# Patient Record
Sex: MALE | Race: OTHER | URBAN - METROPOLITAN AREA
[De-identification: names, ages, dates, MRNs, and addresses within clinical notes are randomized per-mention and may not be internally consistent; named-entity substitution may affect disease eponyms.]

---

## 2023-05-15 ENCOUNTER — EMERGENCY (EMERGENCY)
Facility: HOSPITAL | Age: 23
LOS: 0 days | Discharge: HOME | End: 2023-05-15
Attending: EMERGENCY MEDICINE
Payer: COMMERCIAL

## 2023-05-15 VITALS
HEIGHT: 69 IN | OXYGEN SATURATION: 98 % | TEMPERATURE: 99 F | RESPIRATION RATE: 18 BRPM | SYSTOLIC BLOOD PRESSURE: 137 MMHG | DIASTOLIC BLOOD PRESSURE: 93 MMHG | WEIGHT: 222.01 LBS | HEART RATE: 102 BPM

## 2023-05-15 VITALS
HEART RATE: 75 BPM | TEMPERATURE: 97 F | SYSTOLIC BLOOD PRESSURE: 123 MMHG | OXYGEN SATURATION: 99 % | DIASTOLIC BLOOD PRESSURE: 71 MMHG | RESPIRATION RATE: 19 BRPM

## 2023-05-15 DIAGNOSIS — R07.89 OTHER CHEST PAIN: ICD-10-CM

## 2023-05-15 DIAGNOSIS — K76.0 FATTY (CHANGE OF) LIVER, NOT ELSEWHERE CLASSIFIED: ICD-10-CM

## 2023-05-15 DIAGNOSIS — J45.909 UNSPECIFIED ASTHMA, UNCOMPLICATED: ICD-10-CM

## 2023-05-15 LAB
ALBUMIN SERPL ELPH-MCNC: 4.4 G/DL — SIGNIFICANT CHANGE UP (ref 3.3–5)
ALP SERPL-CCNC: 121 U/L — HIGH (ref 40–120)
ALT FLD-CCNC: 80 U/L — HIGH (ref 12–78)
ANION GAP SERPL CALC-SCNC: 4 MMOL/L — LOW (ref 5–17)
AST SERPL-CCNC: 31 U/L — SIGNIFICANT CHANGE UP (ref 15–37)
BASOPHILS # BLD AUTO: 0.03 K/UL — SIGNIFICANT CHANGE UP (ref 0–0.2)
BASOPHILS NFR BLD AUTO: 0.3 % — SIGNIFICANT CHANGE UP (ref 0–2)
BILIRUB SERPL-MCNC: 8.1 MG/DL — HIGH (ref 0.2–1.2)
BUN SERPL-MCNC: 7 MG/DL — SIGNIFICANT CHANGE UP (ref 7–23)
CALCIUM SERPL-MCNC: 10 MG/DL — SIGNIFICANT CHANGE UP (ref 8.5–10.1)
CHLORIDE SERPL-SCNC: 105 MMOL/L — SIGNIFICANT CHANGE UP (ref 96–108)
CO2 SERPL-SCNC: 29 MMOL/L — SIGNIFICANT CHANGE UP (ref 22–31)
CREAT SERPL-MCNC: 1.17 MG/DL — SIGNIFICANT CHANGE UP (ref 0.5–1.3)
EGFR: 90 ML/MIN/1.73M2 — SIGNIFICANT CHANGE UP
EOSINOPHIL # BLD AUTO: 0.06 K/UL — SIGNIFICANT CHANGE UP (ref 0–0.5)
EOSINOPHIL NFR BLD AUTO: 0.7 % — SIGNIFICANT CHANGE UP (ref 0–6)
GLUCOSE SERPL-MCNC: 104 MG/DL — HIGH (ref 70–99)
HCT VFR BLD CALC: 47.7 % — SIGNIFICANT CHANGE UP (ref 39–50)
HGB BLD-MCNC: 14.6 G/DL — SIGNIFICANT CHANGE UP (ref 13–17)
IMM GRANULOCYTES NFR BLD AUTO: 0.2 % — SIGNIFICANT CHANGE UP (ref 0–0.9)
LIDOCAIN IGE QN: 284 U/L — SIGNIFICANT CHANGE UP (ref 73–393)
LYMPHOCYTES # BLD AUTO: 2.2 K/UL — SIGNIFICANT CHANGE UP (ref 1–3.3)
LYMPHOCYTES # BLD AUTO: 25.6 % — SIGNIFICANT CHANGE UP (ref 13–44)
MAGNESIUM SERPL-MCNC: 2.3 MG/DL — SIGNIFICANT CHANGE UP (ref 1.6–2.6)
MCHC RBC-ENTMCNC: 25.6 PG — LOW (ref 27–34)
MCHC RBC-ENTMCNC: 30.6 G/DL — LOW (ref 32–36)
MCV RBC AUTO: 83.5 FL — SIGNIFICANT CHANGE UP (ref 80–100)
MONOCYTES # BLD AUTO: 0.55 K/UL — SIGNIFICANT CHANGE UP (ref 0–0.9)
MONOCYTES NFR BLD AUTO: 6.4 % — SIGNIFICANT CHANGE UP (ref 2–14)
NEUTROPHILS # BLD AUTO: 5.74 K/UL — SIGNIFICANT CHANGE UP (ref 1.8–7.4)
NEUTROPHILS NFR BLD AUTO: 66.8 % — SIGNIFICANT CHANGE UP (ref 43–77)
NRBC # BLD: 0 /100 WBCS — SIGNIFICANT CHANGE UP (ref 0–0)
PLATELET # BLD AUTO: 188 K/UL — SIGNIFICANT CHANGE UP (ref 150–400)
POTASSIUM SERPL-MCNC: 4.2 MMOL/L — SIGNIFICANT CHANGE UP (ref 3.5–5.3)
POTASSIUM SERPL-SCNC: 4.2 MMOL/L — SIGNIFICANT CHANGE UP (ref 3.5–5.3)
PROT SERPL-MCNC: 8.1 GM/DL — SIGNIFICANT CHANGE UP (ref 6–8.3)
RBC # BLD: 5.71 M/UL — SIGNIFICANT CHANGE UP (ref 4.2–5.8)
RBC # FLD: 13.2 % — SIGNIFICANT CHANGE UP (ref 10.3–14.5)
SODIUM SERPL-SCNC: 138 MMOL/L — SIGNIFICANT CHANGE UP (ref 135–145)
TROPONIN I, HIGH SENSITIVITY RESULT: 6.9 NG/L — SIGNIFICANT CHANGE UP
WBC # BLD: 8.6 K/UL — SIGNIFICANT CHANGE UP (ref 3.8–10.5)
WBC # FLD AUTO: 8.6 K/UL — SIGNIFICANT CHANGE UP (ref 3.8–10.5)

## 2023-05-15 PROCEDURE — 74177 CT ABD & PELVIS W/CONTRAST: CPT | Mod: 26,MA

## 2023-05-15 PROCEDURE — 76700 US EXAM ABDOM COMPLETE: CPT | Mod: 26

## 2023-05-15 PROCEDURE — 71046 X-RAY EXAM CHEST 2 VIEWS: CPT | Mod: 26

## 2023-05-15 PROCEDURE — 93010 ELECTROCARDIOGRAM REPORT: CPT

## 2023-05-15 PROCEDURE — 99285 EMERGENCY DEPT VISIT HI MDM: CPT

## 2023-05-15 RX ORDER — ACETAMINOPHEN 500 MG
975 TABLET ORAL ONCE
Refills: 0 | Status: COMPLETED | OUTPATIENT
Start: 2023-05-15 | End: 2023-05-15

## 2023-05-15 RX ADMIN — Medication 975 MILLIGRAM(S): at 05:20

## 2023-05-15 NOTE — ED PROVIDER NOTE - PATIENT PORTAL LINK FT
You can access the FollowMyHealth Patient Portal offered by [facility] by registering at the following website: [email or web address]. By joining [facility]’s FollowMyHealth portal, you will also be able to view your health information using other applications (apps) compatible with our system.

## 2023-05-15 NOTE — ED PROVIDER NOTE - CLINICAL SUMMARY MEDICAL DECISION MAKING FREE TEXT BOX
cp. doubt acs. perc neg. xray neg for pna, pnthx. gerd? will give tylenol and reassess. As interpreted by ED physician, ECG is NSR with normal intervals/axis, no changes in QRS, no ST/T changes.

## 2023-05-15 NOTE — ED ADULT TRIAGE NOTE - CHIEF COMPLAINT QUOTE
PMH of panic attack  C/o chest pain, dizziness, nausea, right arm numbness. " I feel like having panic attack" Speaks full complete sentences.

## 2023-05-15 NOTE — ED ADULT NURSE NOTE - NSFALLUNIVINTERV_ED_ALL_ED
Bed/Stretcher in lowest position, wheels locked, appropriate side rails in place/Call bell, personal items and telephone in reach/Instruct patient to call for assistance before getting out of bed/chair/stretcher/Non-slip footwear applied when patient is off stretcher/[place] to call system/Physically safe environment - no spills, clutter or unnecessary equipment/Purposeful proactive rounding/Room/bathroom lighting operational, light cord in reach

## 2023-05-15 NOTE — ED PROVIDER NOTE - CARE PLAN
Principal Discharge DX:	Acute chest pain   1 Principal Discharge DX:	Acute chest pain  Secondary Diagnosis:	Hepatic steatosis

## 2023-05-15 NOTE — ED PROVIDER NOTE - OBJECTIVE STATEMENT
22M hx asthma pw midsternal to right sided cp since 2am. started suddenly while sleeping, triggering a panic attack. he notes the panic attack has improved. no sob. no nausea or vomiting. no fever. didn't take anything for pain.

## 2023-05-15 NOTE — ED ADULT NURSE NOTE - OBJECTIVE STATEMENT
Hx asthma, ,panic attack. Pt c/o R sided chest pain and R arm numbness that began at  2 am. Denies radiation, nausea, SOB and dizziness.

## 2025-05-23 ENCOUNTER — EMERGENCY (EMERGENCY)
Facility: HOSPITAL | Age: 25
LOS: 0 days | Discharge: HOME | End: 2025-05-23
Attending: STUDENT IN AN ORGANIZED HEALTH CARE EDUCATION/TRAINING PROGRAM
Payer: COMMERCIAL

## 2025-05-23 VITALS
TEMPERATURE: 99 F | RESPIRATION RATE: 23 BRPM | SYSTOLIC BLOOD PRESSURE: 140 MMHG | HEART RATE: 74 BPM | DIASTOLIC BLOOD PRESSURE: 77 MMHG | OXYGEN SATURATION: 100 %

## 2025-05-23 VITALS
WEIGHT: 240.08 LBS | TEMPERATURE: 98 F | RESPIRATION RATE: 18 BRPM | HEART RATE: 121 BPM | OXYGEN SATURATION: 97 % | HEIGHT: 69 IN | SYSTOLIC BLOOD PRESSURE: 136 MMHG | DIASTOLIC BLOOD PRESSURE: 89 MMHG

## 2025-05-23 DIAGNOSIS — R06.02 SHORTNESS OF BREATH: ICD-10-CM

## 2025-05-23 DIAGNOSIS — R00.2 PALPITATIONS: ICD-10-CM

## 2025-05-23 DIAGNOSIS — G89.18 OTHER ACUTE POSTPROCEDURAL PAIN: ICD-10-CM

## 2025-05-23 DIAGNOSIS — R07.9 CHEST PAIN, UNSPECIFIED: ICD-10-CM

## 2025-05-23 DIAGNOSIS — R10.9 UNSPECIFIED ABDOMINAL PAIN: ICD-10-CM

## 2025-05-23 LAB
ALBUMIN SERPL ELPH-MCNC: 4.8 G/DL — SIGNIFICANT CHANGE UP (ref 3.3–5)
ALP SERPL-CCNC: 128 U/L — HIGH (ref 40–120)
ALT FLD-CCNC: 118 U/L — HIGH (ref 12–78)
ANION GAP SERPL CALC-SCNC: 12 MMOL/L — SIGNIFICANT CHANGE UP (ref 5–17)
APPEARANCE UR: CLEAR — SIGNIFICANT CHANGE UP
AST SERPL-CCNC: 32 U/L — SIGNIFICANT CHANGE UP (ref 15–37)
BACTERIA # UR AUTO: ABNORMAL /HPF
BASOPHILS # BLD AUTO: 0.02 K/UL — SIGNIFICANT CHANGE UP (ref 0–0.2)
BASOPHILS NFR BLD AUTO: 0.2 % — SIGNIFICANT CHANGE UP (ref 0–2)
BILIRUB SERPL-MCNC: 0.8 MG/DL — SIGNIFICANT CHANGE UP (ref 0.2–1.2)
BILIRUB UR-MCNC: NEGATIVE — SIGNIFICANT CHANGE UP
BUN SERPL-MCNC: 11 MG/DL — SIGNIFICANT CHANGE UP (ref 7–23)
CALCIUM SERPL-MCNC: 10 MG/DL — SIGNIFICANT CHANGE UP (ref 8.5–10.1)
CHLORIDE SERPL-SCNC: 101 MMOL/L — SIGNIFICANT CHANGE UP (ref 96–108)
CO2 SERPL-SCNC: 22 MMOL/L — SIGNIFICANT CHANGE UP (ref 22–31)
COLOR SPEC: YELLOW — SIGNIFICANT CHANGE UP
COMMENT - URINE: SIGNIFICANT CHANGE UP
CREAT SERPL-MCNC: 1.16 MG/DL — SIGNIFICANT CHANGE UP (ref 0.5–1.3)
D DIMER BLD IA.RAPID-MCNC: 443 NG/ML DDU — HIGH
DIFF PNL FLD: NEGATIVE — SIGNIFICANT CHANGE UP
EGFR: 90 ML/MIN/1.73M2 — SIGNIFICANT CHANGE UP
EGFR: 90 ML/MIN/1.73M2 — SIGNIFICANT CHANGE UP
EOSINOPHIL # BLD AUTO: 0.01 K/UL — SIGNIFICANT CHANGE UP (ref 0–0.5)
EOSINOPHIL NFR BLD AUTO: 0.1 % — SIGNIFICANT CHANGE UP (ref 0–6)
EPI CELLS # UR: PRESENT
GLUCOSE SERPL-MCNC: 106 MG/DL — HIGH (ref 70–99)
GLUCOSE UR QL: NEGATIVE MG/DL — SIGNIFICANT CHANGE UP
HCT VFR BLD CALC: 48.7 % — SIGNIFICANT CHANGE UP (ref 39–50)
HGB BLD-MCNC: 15.6 G/DL — SIGNIFICANT CHANGE UP (ref 13–17)
HYALINE CASTS # UR AUTO: PRESENT
IMM GRANULOCYTES NFR BLD AUTO: 0.3 % — SIGNIFICANT CHANGE UP (ref 0–0.9)
KETONES UR QL: >=160 MG/DL
LACTATE SERPL-SCNC: 1 MMOL/L — SIGNIFICANT CHANGE UP (ref 0.7–2)
LEUKOCYTE ESTERASE UR-ACNC: NEGATIVE — SIGNIFICANT CHANGE UP
LIDOCAIN IGE QN: 58 U/L — SIGNIFICANT CHANGE UP (ref 13–75)
LYMPHOCYTES # BLD AUTO: 1.35 K/UL — SIGNIFICANT CHANGE UP (ref 1–3.3)
LYMPHOCYTES # BLD AUTO: 13.4 % — SIGNIFICANT CHANGE UP (ref 13–44)
MCHC RBC-ENTMCNC: 25.5 PG — LOW (ref 27–34)
MCHC RBC-ENTMCNC: 32 G/DL — SIGNIFICANT CHANGE UP (ref 32–36)
MCV RBC AUTO: 79.6 FL — LOW (ref 80–100)
MONOCYTES # BLD AUTO: 0.8 K/UL — SIGNIFICANT CHANGE UP (ref 0–0.9)
MONOCYTES NFR BLD AUTO: 8 % — SIGNIFICANT CHANGE UP (ref 2–14)
NEUTROPHILS # BLD AUTO: 7.84 K/UL — HIGH (ref 1.8–7.4)
NEUTROPHILS NFR BLD AUTO: 78 % — HIGH (ref 43–77)
NITRITE UR-MCNC: NEGATIVE — SIGNIFICANT CHANGE UP
NRBC BLD AUTO-RTO: 0 /100 WBCS — SIGNIFICANT CHANGE UP (ref 0–0)
PH UR: 6 — SIGNIFICANT CHANGE UP (ref 5–8)
PLATELET # BLD AUTO: 215 K/UL — SIGNIFICANT CHANGE UP (ref 150–400)
POTASSIUM SERPL-MCNC: 4.4 MMOL/L — SIGNIFICANT CHANGE UP (ref 3.5–5.3)
POTASSIUM SERPL-SCNC: 4.4 MMOL/L — SIGNIFICANT CHANGE UP (ref 3.5–5.3)
PROT SERPL-MCNC: 9.1 GM/DL — HIGH (ref 6–8.3)
PROT UR-MCNC: 30 MG/DL
RBC # BLD: 6.12 M/UL — HIGH (ref 4.2–5.8)
RBC # FLD: 13.6 % — SIGNIFICANT CHANGE UP (ref 10.3–14.5)
RBC CASTS # UR COMP ASSIST: 0 /HPF — SIGNIFICANT CHANGE UP (ref 0–4)
SODIUM SERPL-SCNC: 135 MMOL/L — SIGNIFICANT CHANGE UP (ref 135–145)
SP GR SPEC: >1.03 — HIGH (ref 1–1.03)
UROBILINOGEN FLD QL: 1 MG/DL — SIGNIFICANT CHANGE UP (ref 0.2–1)
WBC # BLD: 10.05 K/UL — SIGNIFICANT CHANGE UP (ref 3.8–10.5)
WBC # FLD AUTO: 10.05 K/UL — SIGNIFICANT CHANGE UP (ref 3.8–10.5)
WBC UR QL: 2 /HPF — SIGNIFICANT CHANGE UP (ref 0–5)

## 2025-05-23 PROCEDURE — 71045 X-RAY EXAM CHEST 1 VIEW: CPT | Mod: 26

## 2025-05-23 PROCEDURE — 93010 ELECTROCARDIOGRAM REPORT: CPT

## 2025-05-23 PROCEDURE — 71275 CT ANGIOGRAPHY CHEST: CPT | Mod: 26

## 2025-05-23 PROCEDURE — 99285 EMERGENCY DEPT VISIT HI MDM: CPT

## 2025-05-23 PROCEDURE — 74177 CT ABD & PELVIS W/CONTRAST: CPT | Mod: 26

## 2025-05-23 RX ORDER — ACETAMINOPHEN 500 MG/5ML
1000 LIQUID (ML) ORAL ONCE
Refills: 0 | Status: COMPLETED | OUTPATIENT
Start: 2025-05-23 | End: 2025-05-23

## 2025-05-23 RX ORDER — SENNOSIDES, DOCUSATE SODIUM 8.6; 5 MG/1; MG/1
2 TABLET ORAL
Qty: 40 | Refills: 0
Start: 2025-05-23 | End: 2025-06-01

## 2025-05-23 RX ORDER — OXYCODONE HYDROCHLORIDE AND ACETAMINOPHEN 10; 325 MG/1; MG/1
1 TABLET ORAL
Qty: 15 | Refills: 0
Start: 2025-05-23 | End: 2025-05-27

## 2025-05-23 RX ORDER — ACETAMINOPHEN 500 MG/5ML
2 LIQUID (ML) ORAL
Qty: 30 | Refills: 0
Start: 2025-05-23 | End: 2025-05-27

## 2025-05-23 RX ORDER — IOHEXOL 350 MG/ML
30 INJECTION, SOLUTION INTRAVENOUS ONCE
Refills: 0 | Status: COMPLETED | OUTPATIENT
Start: 2025-05-23 | End: 2025-05-23

## 2025-05-23 RX ADMIN — Medication 1000 MILLIGRAM(S): at 04:00

## 2025-05-23 RX ADMIN — Medication 4 MILLIGRAM(S): at 03:29

## 2025-05-23 RX ADMIN — IOHEXOL 30 MILLILITER(S): 350 INJECTION, SOLUTION INTRAVENOUS at 03:38

## 2025-05-23 RX ADMIN — Medication 1000 MILLILITER(S): at 05:12

## 2025-05-23 RX ADMIN — Medication 400 MILLIGRAM(S): at 04:08

## 2025-05-23 RX ADMIN — Medication 4 MILLIGRAM(S): at 05:50

## 2025-05-23 RX ADMIN — Medication 4 MILLIGRAM(S): at 05:17

## 2025-05-23 RX ADMIN — Medication 1000 MILLILITER(S): at 03:30

## 2025-05-23 RX ADMIN — Medication 4 MILLIGRAM(S): at 04:00

## 2025-05-23 NOTE — ED PROVIDER NOTE - NS_EDPROVIDERDISPOUSERTYPE_ED_A_ED
History of Present Illness		  This is a 62 year old male with a PMHx of HTN, HLD, DM with insulin resistance, CRI, severe ICM, sp CABG 3V in 2014, AICD (BostS) in 2014, PVD sp right iliac artery and left SFA stent in the past, moderate L carotid stenosis, sleep apnea, and VT. He has be c/o dyspnea and bilateral claudication.  He was sent for a PET/CT sestamibi 2/26/2020 which showed a moderate area of moderate ischemia involving the anterior and anterolateral wall.  Patient for cardiac catheterization 3/10/2020.    ASA 2  Mallampati II  GFR 40  Creat  1.76  Bleeding Risk 1.6%     NPO>6 hours  Dr Blanco to consent  Daily compliance with aspirin/plavix and took today  IVF for CRI in progress-followed by Dr renioso        REVIEW OF SYSTEMS:  Denies SOB, CP, NV, HA, dizziness, palpitations    PHYSICAL EXAM: A&Ox3 NAD Skin warm and dry  NEURO: Speech intact +gag +swallow Tongue midline JACOME  NECK: No JVD, trachea midline. Eupneic  HEART: RRR S1S2 no g/m SR on tele no ectopy BSC ICD  PULMONARY:  CTA claudine  ABDOMEN: Soft nontender X4 +BS   EXTREMITIES:  JACOME/FROM all pp+/palpable History of Present Illness		  This is a 62 year old male with a PMHx of HTN, HLD, DM with insulin resistance, CRI, severe ICM, sp CABG 3V in 2014, AICD (BostS) in 2014, PVD sp right iliac artery and left SFA stent in the past, moderate L carotid stenosis, sleep apnea, and VT. He has be c/o dyspnea and bilateral claudication.  He was sent for a PET/CT sestamibi 2/26/2020 which showed a moderate area of moderate ischemia involving the anterior and anterolateral wall.  Patient for cardiac catheterization 3/10/2020.    ASA 2  Mallampati II  GFR 40  Creat  1.76  Bleeding Risk 1.6%     NPO>6 hours  Dr Blanco to consent  Daily compliance with aspirin/plavix and took today  IVF for CRI in progress-followed by Dr reinoso        REVIEW OF SYSTEMS:  Denies SOB, CP, NV, HA, dizziness, palpitations    PHYSICAL EXAM: A&Ox3 NAD Skin warm and dry  NEURO: Speech intact +gag +swallow Tongue midline JACOME  NECK: No JVD, trachea midline. Eupneic  HEART: RRR S1S2 no g/m SR on tele no ectopy BSC ICD  PULMONARY:  CTA claudine  ABDOMEN: Soft nontender X4 +BS   EXTREMITIES:  JACOME/FROM all pp+/palpable Attending Attestation (For Attendings USE Only)...

## 2025-05-23 NOTE — ED ADULT NURSE NOTE - OBJECTIVE STATEMENT
Pt presents Pt presents to ED c/o palpitations, chest pain, nausea and abdominal pain. Says he had gastric sleeve procedure performed on 05/21/25 at [facility] and was discharged the same day of the procedure. Says symptoms started same day of procedure. No bowel movements since procedure. Pmh asthma.

## 2025-05-23 NOTE — ED PROVIDER NOTE - NS ED ROS FT
General: Denies fever, chills  HEENT: Denies sensory changes, sore throat  Neck: Denies neck pain, neck stiffness  Resp: + SOB  Cardiovascular: + chest pain  GI: + abd pain  : Denies dysuria, hematuria, frequency, incontinence  MSK: Denies back pain  Neuro: Denies HA, dizziness, numbness, weakness  Skin: Denies rashes.

## 2025-05-23 NOTE — ED PROVIDER NOTE - OBJECTIVE STATEMENT
24 M pmh recent gastric sleeve (05/21) presenting to the ED for palpitations, chest and abdominal pain. Pain has been increased and partner noticed elevations in heart rate to > 120bpm. Pt has been taking oxycodone for pain

## 2025-05-23 NOTE — ED PROVIDER NOTE - CLINICAL SUMMARY MEDICAL DECISION MAKING FREE TEXT BOX
24 M POD 2 s/p gastric sleeve  presenting to the ED for abdominal pain, chest pain and palpitations    concern for post-op complication, obstruction, PNA, r/o PE, PTX  labs  CXR, CT abd/pelvis  pain control  IV fluids 24 M POD 2 s/p gastric sleeve  presenting to the ED for abdominal pain, chest pain and palpitations  pt follows with [facility]    concern for post-op complication, obstruction, PNA, r/o PE, PTX  labs  CXR, CT abd/pelvis  pain control  IV fluids  dispo pending imaging

## 2025-05-23 NOTE — ED ADULT NURSE NOTE - CHIEF COMPLAINT QUOTE
Complaining of Palpitations started 1 hour ago, abdominal pain and "consistent chest pain" after surgery. h/o gastric sleeve sx on 5/21/25 at [facility].last oral intake 4 hours ago

## 2025-05-23 NOTE — ED ADULT TRIAGE NOTE - CHIEF COMPLAINT QUOTE
Complaining of Palpitations started 1 hour ago, abdominal pain and "consistent chest pain" after surgery. h/o gastric sleeve sx on 5/21 [facility] Complaining of Palpitations started 1 hour ago, abdominal pain and "consistent chest pain" after surgery. h/o gastric sleeve sx on 5/21/25 at [facility]. Complaining of Palpitations started 1 hour ago, abdominal pain and "consistent chest pain" after surgery. h/o gastric sleeve sx on 5/21/25 at [facility].last oral intake 4 hours ago

## 2025-05-23 NOTE — ED PROVIDER NOTE - CONSIDERATION OF ADMISSION OBSERVATION
Consideration of Admission/Observation considered admission for post-operative complication, persistent tachycardia

## 2025-05-23 NOTE — ED PROVIDER NOTE - PHYSICAL EXAMINATION
General: Well appearing male in no acute distress  HEENT: Normocephalic, atraumatic. Moist mucous membranes. Oropharynx clear. No lymphadenopathy.  Eyes: No scleral icterus. EOMI. ANANT.  Neck:. Soft and supple. Full ROM without pain. No midline tenderness  Cardiac: Regular rate and regular rhythm. No murmurs, rubs, gallops. Peripheral pulses 2+ and symmetric. No LE edema.  Resp: Lungs CTAB. Speaking in full sentences. No wheezes, rales or rhonchi.  Abd: Soft, recent surgical scars, diffuse abd ttp  Back: Spine midline and non-tender. No CVA tenderness.    Skin: No rashes, abrasions, or lacerations.  Neuro: AO x 3. Moves all extremities symmetrically. Motor strength and sensation grossly intact.

## 2025-05-23 NOTE — ED PROVIDER NOTE - PROGRESS NOTE DETAILS
Other (Free Text): [facility] - NER; L Medial Upper Back; 12/2016 ED&C BL Detail Level: Detailed Other (Free Text): Removed 2004 by Previous Dermatologist Note Text (......Xxx Chief Complaint.): This diagnosis correlates with the Patient's pain resolved. incentive spirometry encouraged. imaging negative for acute pathology. VS wnl  dispo home w/ outpatient follow-up with [facility]

## 2025-05-23 NOTE — ED PROVIDER NOTE - PATIENT PORTAL LINK FT
You can access the FollowMyHealth Patient Portal offered by [facility] by registering at the following website: [email or web address]. By joining [facility]’s FollowMyHealth portal, you will also be able to view your health information using other applications (apps) compatible with our system.

## 2025-06-11 ENCOUNTER — EMERGENCY (EMERGENCY)
Facility: HOSPITAL | Age: 25
LOS: 0 days | Discharge: HOME | End: 2025-06-11
Attending: STUDENT IN AN ORGANIZED HEALTH CARE EDUCATION/TRAINING PROGRAM
Payer: COMMERCIAL

## 2025-06-11 VITALS
DIASTOLIC BLOOD PRESSURE: 70 MMHG | TEMPERATURE: 98 F | SYSTOLIC BLOOD PRESSURE: 102 MMHG | HEART RATE: 84 BPM | RESPIRATION RATE: 15 BRPM | OXYGEN SATURATION: 98 %

## 2025-06-11 VITALS — HEIGHT: 69 IN | OXYGEN SATURATION: 99 % | WEIGHT: 220.02 LBS | HEART RATE: 98 BPM

## 2025-06-11 DIAGNOSIS — M79.632 PAIN IN LEFT FOREARM: ICD-10-CM

## 2025-06-11 DIAGNOSIS — R20.2 PARESTHESIA OF SKIN: ICD-10-CM

## 2025-06-11 DIAGNOSIS — Z98.84 BARIATRIC SURGERY STATUS: ICD-10-CM

## 2025-06-11 DIAGNOSIS — J45.909 UNSPECIFIED ASTHMA, UNCOMPLICATED: ICD-10-CM

## 2025-06-11 PROCEDURE — 99284 EMERGENCY DEPT VISIT MOD MDM: CPT

## 2025-06-11 PROCEDURE — 93971 EXTREMITY STUDY: CPT | Mod: 26,LT

## 2025-06-11 RX ORDER — ACETAMINOPHEN 500 MG/5ML
975 LIQUID (ML) ORAL ONCE
Refills: 0 | Status: COMPLETED | OUTPATIENT
Start: 2025-06-11 | End: 2025-06-11

## 2025-06-11 RX ADMIN — Medication 975 MILLIGRAM(S): at 21:18

## 2025-06-11 RX ADMIN — Medication 975 MILLIGRAM(S): at 19:17

## 2025-06-11 NOTE — ED PROVIDER NOTE - ATTENDING APP SHARED VISIT CONTRIBUTION OF CARE
I have personally performed a face to face medical and diagnostic evaluation of the patient. I have discussed with and reviewed the BETO's note and agree with the History, ROS, Physical Exam and MDM unless otherwise indicated. A brief summary of my personal evaluation and impression can be found in the MDM. I actively participated in the comanagement of this patient with the BETO. I have personally reviewed all orders, study/imaging results, medication orders. I discussed indications for consultant evaluation and consultant recommendations with the BETO when applicable, and have discussed the disposition plan with the BETO.

## 2025-06-11 NOTE — ED PROVIDER NOTE - NSFOLLOWUPINSTRUCTIONS_ED_ALL_ED_FT
- You were seen in the ER today for left arm pain.     - Ultrasound of the arm was performed which was negative for blood clots, see a copy of your results below.    - Take Tylenol 650mg (Two 325 mg pills) every 4-6 hours as needed for pain.    - Advance activity as tolerated.     - Continue all previously prescribed medications as directed unless otherwise instructed.     - Follow-up with Your primary care doctor for continued symptoms as discussed.     -Return to Emergency room for any worsening or persistent pain, weakness, numbness, fever, color change to extremity, or any other concerning symptoms.

## 2025-06-11 NOTE — ED PROVIDER NOTE - PATIENT PORTAL LINK FT
You can access the FollowMyHealth Patient Portal offered by [facility] by registering at the following website: [email or web address]. By joining [facility]’s FollowMyHealth portal, you will also be able to view your health information using other applications (apps) compatible with our system.

## 2025-06-11 NOTE — ED PROVIDER NOTE - CLINICAL SUMMARY MEDICAL DECISION MAKING FREE TEXT BOX
DO Domenic: 24-year-old male with a past medical history of asthma, recent gastric sleeve surgery presenting with left upper extremity paresthesias. Patient states 3 days ago he was in the hospital. Nurse attempted IV in left upper extremity and patient immediately felt paresthesias in the left medial forearm. Since discharge from the hospital patient continues to have numbness and paresthesias of the left forearm distal to the elbow. Denies weakness in the left upper extremity, fevers. Physical exam reveals well-appearing male, 5/5 strength in bilateral upper extremities, bilateral radial and ulnar pulses intact, capillary refill in left fingertips less than 1 second, decreased sensation over left medial forearm dermatome. Most likely peripheral nerve injury secondary to IV insertion. Low suspicion for vascular injury or DVT. Patient offered CT angio of left upper extremity however is declining at this time. Will obtain left upper extremity DVT study and give Tylenol.

## 2025-06-11 NOTE — ED PROVIDER NOTE - PHYSICAL EXAMINATION
General: Appears well and nontoxic  Mentation: A&O x 3  psych: mood appropriate  HEENT: airway patent, conjunctivae clear bilaterally  Resp: symmetric chest rise, no resp distress  Neuro: sensation and motor function grossly intact, 5/5 strength in bilateral upper extremities, decreased sensation over left medial forearm dermatome  Skin: no cyanosis, no jaundice  MSK: normal movement of all extremities  Vasc: bilateral radial and ulnar pulses intact, capillary refill in left fingertips less than 1 second

## 2025-06-11 NOTE — ED PROVIDER NOTE - PROGRESS NOTE DETAILS
TIMBO Lazar: Patient received in results waiting at time of disposition. Patient is AOx3, non-toxic appearing. Patient is a 24-year-old male presented to ER for evaluation of left forearm numbness s/p IV placed in arm during hospital stay 3 days ago.  Patient denies fever, chills, weakness of the arm, other complaints.  PE pertinent for decreased sensation to left medial forearm no erythema, edema, calor, wounds with exudate, capillary refill less than 2, radial pulses 2+ B/L.  Ultrasound obtained to rule out DVT which was negative.  Results discussed with patient.  Patient encouraged to follow-up with his primary care doctor. Patient encouraged to use Tylenol and hot packs as needed for pain.  Patient unable to take ibuprofen 2/2 gastric sleeve.  Patient provided strict return precautions, patient provided time to ask questions which were answered at the bedside by me.  Patient is ready for discharge home.

## 2025-06-11 NOTE — ED ADULT NURSE NOTE - OBJECTIVE STATEMENT
Patient is a 24 years old male, alert & oriented x 4 complaining of left arm pain. Patient verbalized, " I was stuck at another hospital for an IV insertion and it made my left arm numb for days." Patient is unable to use his left arm because of the pain. Patient had a recent Gastric Bypass surgery at [facility] on June 8, 2025. PMHX: Asthma

## 2025-06-11 NOTE — ED ADULT TRIAGE NOTE - CHIEF COMPLAINT QUOTE
Patient reports Left lower arm pain after an IV insertion on 6/8/25. Distal pulse intact + pins and needles "It feel numb and asleep" Pain 10/10 when attempts to extend arm  PMH: Gastric sleeve, Asthma